# Patient Record
Sex: MALE | Race: BLACK OR AFRICAN AMERICAN
[De-identification: names, ages, dates, MRNs, and addresses within clinical notes are randomized per-mention and may not be internally consistent; named-entity substitution may affect disease eponyms.]

---

## 2019-07-17 ENCOUNTER — HOSPITAL ENCOUNTER (EMERGENCY)
Dept: HOSPITAL 91 - FTE | Age: 29
Discharge: HOME | End: 2019-07-17
Payer: SELF-PAY

## 2019-07-17 ENCOUNTER — HOSPITAL ENCOUNTER (EMERGENCY)
Dept: HOSPITAL 10 - FTE | Age: 29
Discharge: HOME | End: 2019-07-17
Payer: SELF-PAY

## 2019-07-17 VITALS — HEIGHT: 60 IN | WEIGHT: 270.57 LBS | BODY MASS INDEX: 53.12 KG/M2

## 2019-07-17 VITALS — RESPIRATION RATE: 20 BRPM | HEART RATE: 80 BPM | DIASTOLIC BLOOD PRESSURE: 89 MMHG | SYSTOLIC BLOOD PRESSURE: 139 MMHG

## 2019-07-17 DIAGNOSIS — J45.909: ICD-10-CM

## 2019-07-17 DIAGNOSIS — S32.2XXA: Primary | ICD-10-CM
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
## 2019-07-17 PROCEDURE — 99283 EMERGENCY DEPT VISIT LOW MDM: CPT

## 2019-07-17 PROCEDURE — 72220 X-RAY EXAM SACRUM TAILBONE: CPT

## 2019-07-17 NOTE — ERD
ER Documentation


Chief Complaint


Chief Complaint





tailbone pain s/p fall of scooter on thurs.





HPI


28-year-old male presenting with pain to his tailbone area.  1 week ago patient 


fell off of a scooter and landed on his buttocks.  Patient has no numbness  or 


tingling.  Patient has pain with sitting.  He is been able to have normal bowel 


movements and urination.  Denies any weakness to his legs.  Denies medical 


problems.  NKDA.  Surgical history denies.  Social history denies





ROS


All systems reviewed and are negative except as per history of present illness.





Medications


Home Meds


Active Scripts


Docusate Sodium* (Colace*) 100 Mg Capsule, 100 MG PO TID, #30 CAP


   Prov:XAVIER WALKER PA-C         19


Naproxen* (Naprosyn*) 500 Mg Tablet, 500 MG PO BID PRN for PAIN AND/OR 


INFLAMMATION, #30 TAB


   Prov:XAVIER WALKER PA-C         19


Hydrocodone/Acetaminophen (Norco 5-325 Tablet) 1 Each Tablet, 1 TAB PO Q6H PRN 


for PAIN, #7 TAB


   Prov:XAVIER WALKER PA-C         19





Allergies


Allergies:  


Coded Allergies:  


     No Known Allergy (Unverified , 19)





PMhx/Soc


History of Surgery:  No


Anesthesia Reaction:  No


Hx Neurological Disorder:  No


Hx Respiratory Disorders:  Yes (asthma)


Hx Cardiac Disorders:  No


Hx Psychiatric Problems:  No


Hx Miscellaneous Medical Probl:  No


Hx Alcohol Use:  Yes (social)


Hx Substance Use:  Yes (Marijuana)


Hx Tobacco Use:  No


Smoking Status:  Never smoker





FmHx


Family History:  No diabetes, No coronary disease, No other





Physical Exam


Vitals





Vital Signs


  Date      Temp  Pulse  Resp  B/P (MAP)   Pulse Ox  O2          O2 Flow    FiO2


Time                                                 Delivery    Rate


   19  98.0     80    20      139/89       100


     10:04                          (106)





Physical Exam


GENERAL: The patient is well-appearing, well-nourished, in no acute distress


CHEST: Clear to auscultation bilaterally.  There are no rales, wheezes or 


rhonchi.


HEART: Regular rate and rhythm.  No murmurs, clicks, rubs or gallops. 


BACK: No midline or flank tenderness.  Tender to palpation over the buttocks.


EXTREMITIES: Equal pulses bilaterally.  There is no peripheral clubbing, 


cyanosis or edema.  No focal swelling or erythema.  Full range of motion.  


Grossly neurovascularly intact.


NEUROLOGIC: Alert and oriented.  Cranial nerves II through XII intact.  Motor 


strength in all 4 extremities with 5 out of 5 strength.  Sensation grossly 


intact.  Normal speech and gait.  


SKIN: There is no apparent rash or petechiae.  The skin is warm and dry.





Procedures/MDM


                            DIAGNOSTIC IMAGING REPORT





Patient: DANYELLE FRANCES   : 1990   Age: 28  Sex: M                       





       MR #:    [id number]   Acct #:   [id number]    DOS: 19 1100


Ordering MD: JORDI WALKER PA-C   Location:  [facility]   Room/Bed:            


                               


                                        


PROCEDURE:   XR sacrum and coccyx  . 


 


CLINICAL INDICATION:   Pain


 


TECHNIQUE:   AP and lateral views of the sacrum and coccyx were performed. 


 


COMPARISON:   No prior studies are available for comparison. 


 


FINDINGS:


 


There is a mildly displaced fracture involving the lower third of the sacrum.


The sacroiliac joints appear normal. The soft tissues are unremarkable.


 


RPTAT: AA 


 


IMPRESSION:


Mildly displaced fracture involving the lower third of the sacrum.


Follow-up CT is recommended for further evaluation.





MDM: 28-year-old male presenting with lower back pain.  Patient has findings 


consistent with fracture of the sacrum.  I have low suspicion for neuro deficit.


 I have low suspicion for tendon or ligament injury.  Patient is discharged with


strict ER precautions patient was told symptoms change or worsen to return 


immediately to the ER.  I have low place patient on stool softeners to help 


alleviate pain when having a bowel movement.  All questions answered at 


discharge





Departure


Diagnosis:  


   Primary Impression:  


   Fractured coccyx


Condition:  Stable


Patient Instructions:  Fracture, Coccyx


Referrals:  


COMMUNITY CLINICS


YOU HAVE RECEIVED A MEDICAL SCREENING EXAM AND THE RESULTS INDICATE THAT YOU DO 


NOT HAVE A CONDITION THAT REQUIRES URGENT TREATMENT IN THE EMERGENCY DEPARTMENT.





FURTHER EVALUATION AND TREATMENT OF YOUR CONDITION CAN WAIT UNTIL YOU ARE SEEN 


IN YOUR DOCTORS OFFICE WITHIN THE NEXT 1-2 DAYS. IT IS YOUR RESPONSIBILITY TO 


MAKE AN APPOINTMENT FOR FOLOW-UP CARE.





IF YOU HAVE A PRIMARY DOCTOR


--you should call your primary doctor and schedule an appointment





IF YOU DO NOT HAVE A PRIMARY DOCTOR YOU CAN CALL OUR PHYSICIAN REFERRAL HOTLINE 


AT


 [phone] 





IF YOU CAN NOT AFFORD TO SEE A PHYSICIAN YOU CAN CHOSE FROM THE FOLLOWING 


COMMUNITY CLINICS





[facility] [phone] [address] [facility] [phone] [address] [facility]. [facility] [phone] [address] [facility] [phone] [address] [facility] [phone] [address] [facility]. [facility] [phone]


[address]





Additional Instructions:  


FOLLOW UP WITH YOUR PRIMARY CARE PHYSICIAN TOMORROW.Return to this facility if 


you are not improving as expected.











XAVIER WALKER PA-C       2019 14:01